# Patient Record
Sex: MALE | Race: BLACK OR AFRICAN AMERICAN | NOT HISPANIC OR LATINO | Employment: FULL TIME | URBAN - METROPOLITAN AREA
[De-identification: names, ages, dates, MRNs, and addresses within clinical notes are randomized per-mention and may not be internally consistent; named-entity substitution may affect disease eponyms.]

---

## 2023-03-08 ENCOUNTER — TELEPHONE (OUTPATIENT)
Dept: PRIMARY CARE | Facility: CLINIC | Age: 53
End: 2023-03-08
Payer: COMMERCIAL

## 2023-03-08 NOTE — TELEPHONE ENCOUNTER
Patient c/o feet swelling for 5 days. There are no appointments. Patient was offered the soonest appointment with Dr. Hernandez on 3/13/23, Monday, and advised to go to  if he needed to be seen asap. Patient declined appointment with Dr. Hernandez and said he'll figure something out. He knows you're out of the office today.

## 2023-05-19 DIAGNOSIS — N40.0 BENIGN PROSTATIC HYPERPLASIA, UNSPECIFIED WHETHER LOWER URINARY TRACT SYMPTOMS PRESENT: ICD-10-CM

## 2023-05-19 DIAGNOSIS — N40.0 BENIGN PROSTATIC HYPERPLASIA, UNSPECIFIED WHETHER LOWER URINARY TRACT SYMPTOMS PRESENT: Primary | ICD-10-CM

## 2023-05-19 PROBLEM — R07.89 TIGHTNESS IN CHEST: Status: ACTIVE | Noted: 2023-05-19

## 2023-05-19 PROBLEM — E55.9 VITAMIN D DEFICIENCY: Status: ACTIVE | Noted: 2023-05-19

## 2023-05-19 PROBLEM — I10 HYPERTENSION, ESSENTIAL, BENIGN: Status: ACTIVE | Noted: 2023-05-19

## 2023-05-19 PROBLEM — R13.10 DYSPHAGIA: Status: ACTIVE | Noted: 2023-05-19

## 2023-05-19 PROBLEM — M76.829 POSTERIOR TIBIAL TENDON DYSFUNCTION: Status: ACTIVE | Noted: 2023-05-19

## 2023-05-19 PROBLEM — R10.12 LEFT UPPER QUADRANT ABDOMINAL PAIN: Status: ACTIVE | Noted: 2023-05-19

## 2023-05-19 PROBLEM — E78.5 HYPERLIPIDEMIA: Status: ACTIVE | Noted: 2023-05-19

## 2023-05-19 PROBLEM — R06.02 SHORTNESS OF BREATH: Status: ACTIVE | Noted: 2023-05-19

## 2023-05-19 PROBLEM — M1A.0790 CHRONIC GOUT OF FOOT: Status: ACTIVE | Noted: 2023-05-19

## 2023-05-19 PROBLEM — G89.29 CHRONIC BACK PAIN: Status: ACTIVE | Noted: 2023-05-19

## 2023-05-19 PROBLEM — Z98.84 S/P GASTRIC BYPASS: Status: ACTIVE | Noted: 2023-05-19

## 2023-05-19 PROBLEM — R73.03 PREDIABETES: Status: ACTIVE | Noted: 2023-05-19

## 2023-05-19 PROBLEM — M79.673 FOOT PAIN: Status: ACTIVE | Noted: 2023-05-19

## 2023-05-19 PROBLEM — M54.9 CHRONIC BACK PAIN: Status: ACTIVE | Noted: 2023-05-19

## 2023-05-19 PROBLEM — M77.41 METATARSALGIA, RIGHT FOOT: Status: ACTIVE | Noted: 2023-05-19

## 2023-05-19 PROBLEM — N18.9 CKD (CHRONIC KIDNEY DISEASE): Status: ACTIVE | Noted: 2023-05-19

## 2023-05-19 PROBLEM — R07.89 ATYPICAL CHEST PAIN: Status: ACTIVE | Noted: 2023-05-19

## 2023-05-19 PROBLEM — E03.9 ACQUIRED HYPOTHYROIDISM: Status: ACTIVE | Noted: 2023-05-19

## 2023-05-19 LAB
ALANINE AMINOTRANSFERASE (SGPT) (U/L) IN SER/PLAS: 41 U/L (ref 10–52)
ALBUMIN (G/DL) IN SER/PLAS: 4.7 G/DL (ref 3.4–5)
ALKALINE PHOSPHATASE (U/L) IN SER/PLAS: 65 U/L (ref 33–120)
ANION GAP IN SER/PLAS: 15 MMOL/L (ref 10–20)
ASPARTATE AMINOTRANSFERASE (SGOT) (U/L) IN SER/PLAS: 40 U/L (ref 9–39)
BASOPHILS (10*3/UL) IN BLOOD BY AUTOMATED COUNT: 0.05 X10E9/L (ref 0–0.1)
BASOPHILS/100 LEUKOCYTES IN BLOOD BY AUTOMATED COUNT: 1.3 % (ref 0–2)
BILIRUBIN TOTAL (MG/DL) IN SER/PLAS: 0.5 MG/DL (ref 0–1.2)
CALCIUM (MG/DL) IN SER/PLAS: 9.7 MG/DL (ref 8.6–10.6)
CARBON DIOXIDE, TOTAL (MMOL/L) IN SER/PLAS: 30 MMOL/L (ref 21–32)
CHLORIDE (MMOL/L) IN SER/PLAS: 99 MMOL/L (ref 98–107)
CREATININE (MG/DL) IN SER/PLAS: 1.48 MG/DL (ref 0.5–1.3)
EOSINOPHILS (10*3/UL) IN BLOOD BY AUTOMATED COUNT: 0.14 X10E9/L (ref 0–0.7)
EOSINOPHILS/100 LEUKOCYTES IN BLOOD BY AUTOMATED COUNT: 3.7 % (ref 0–6)
ERYTHROCYTE DISTRIBUTION WIDTH (RATIO) BY AUTOMATED COUNT: 13.3 % (ref 11.5–14.5)
ERYTHROCYTE MEAN CORPUSCULAR HEMOGLOBIN CONCENTRATION (G/DL) BY AUTOMATED: 34.3 G/DL (ref 32–36)
ERYTHROCYTE MEAN CORPUSCULAR VOLUME (FL) BY AUTOMATED COUNT: 86 FL (ref 80–100)
ERYTHROCYTES (10*6/UL) IN BLOOD BY AUTOMATED COUNT: 4.74 X10E12/L (ref 4.5–5.9)
GFR MALE: 56 ML/MIN/1.73M2
GLUCOSE (MG/DL) IN SER/PLAS: 83 MG/DL (ref 74–99)
HEMATOCRIT (%) IN BLOOD BY AUTOMATED COUNT: 40.8 % (ref 41–52)
HEMOGLOBIN (G/DL) IN BLOOD: 14 G/DL (ref 13.5–17.5)
IMMATURE GRANULOCYTES/100 LEUKOCYTES IN BLOOD BY AUTOMATED COUNT: 0.5 % (ref 0–0.9)
LEUKOCYTES (10*3/UL) IN BLOOD BY AUTOMATED COUNT: 3.7 X10E9/L (ref 4.4–11.3)
LYMPHOCYTES (10*3/UL) IN BLOOD BY AUTOMATED COUNT: 1.34 X10E9/L (ref 1.2–4.8)
LYMPHOCYTES/100 LEUKOCYTES IN BLOOD BY AUTOMATED COUNT: 35.8 % (ref 13–44)
MONOCYTES (10*3/UL) IN BLOOD BY AUTOMATED COUNT: 0.47 X10E9/L (ref 0.1–1)
MONOCYTES/100 LEUKOCYTES IN BLOOD BY AUTOMATED COUNT: 12.6 % (ref 2–10)
NEUTROPHILS (10*3/UL) IN BLOOD BY AUTOMATED COUNT: 1.72 X10E9/L (ref 1.2–7.7)
NEUTROPHILS/100 LEUKOCYTES IN BLOOD BY AUTOMATED COUNT: 46.1 % (ref 40–80)
NRBC (PER 100 WBCS) BY AUTOMATED COUNT: 0 /100 WBC (ref 0–0)
PLATELETS (10*3/UL) IN BLOOD AUTOMATED COUNT: 215 X10E9/L (ref 150–450)
POTASSIUM (MMOL/L) IN SER/PLAS: 4.2 MMOL/L (ref 3.5–5.3)
PROTEIN TOTAL: 7.8 G/DL (ref 6.4–8.2)
SODIUM (MMOL/L) IN SER/PLAS: 140 MMOL/L (ref 136–145)
URATE (MG/DL) IN SER/PLAS: 7.3 MG/DL (ref 4–7.5)
UREA NITROGEN (MG/DL) IN SER/PLAS: 22 MG/DL (ref 6–23)

## 2023-05-19 RX ORDER — OXYBUTYNIN CHLORIDE 10 MG/1
10 TABLET, EXTENDED RELEASE ORAL DAILY
Qty: 90 TABLET | Refills: 1 | Status: SHIPPED | OUTPATIENT
Start: 2023-05-19 | End: 2023-05-19 | Stop reason: SDUPTHER

## 2023-05-19 RX ORDER — OXYBUTYNIN CHLORIDE 10 MG/1
10 TABLET, EXTENDED RELEASE ORAL DAILY
Qty: 90 TABLET | Refills: 1 | Status: SHIPPED | OUTPATIENT
Start: 2023-05-19 | End: 2023-12-29

## 2023-05-19 RX ORDER — OXYBUTYNIN CHLORIDE 10 MG/1
1 TABLET, EXTENDED RELEASE ORAL DAILY
COMMUNITY
Start: 2021-10-18 | End: 2023-05-19 | Stop reason: SDUPTHER

## 2023-08-14 DIAGNOSIS — F32.A DEPRESSION, UNSPECIFIED DEPRESSION TYPE: Primary | ICD-10-CM

## 2023-08-14 RX ORDER — VENLAFAXINE HYDROCHLORIDE 150 MG/1
150 CAPSULE, EXTENDED RELEASE ORAL
COMMUNITY
End: 2023-08-14 | Stop reason: SDUPTHER

## 2023-08-14 RX ORDER — ASPIRIN 81 MG/1
1 TABLET ORAL DAILY
COMMUNITY
Start: 2021-10-18 | End: 2024-04-22 | Stop reason: SDUPTHER

## 2023-08-14 RX ORDER — CYCLOBENZAPRINE HCL 10 MG
1 TABLET ORAL DAILY PRN
COMMUNITY
Start: 2021-10-18 | End: 2023-09-26 | Stop reason: SDUPTHER

## 2023-08-14 RX ORDER — AMITRIPTYLINE HYDROCHLORIDE 10 MG/1
TABLET, FILM COATED ORAL
COMMUNITY
Start: 2021-10-18

## 2023-08-14 RX ORDER — ALLOPURINOL 100 MG/1
TABLET ORAL
COMMUNITY
Start: 2023-03-29 | End: 2024-04-22 | Stop reason: SDUPTHER

## 2023-08-14 RX ORDER — COLCHICINE 0.6 MG/1
1 CAPSULE ORAL DAILY
COMMUNITY
Start: 2023-03-29 | End: 2023-09-26 | Stop reason: ALTCHOICE

## 2023-08-14 RX ORDER — VENLAFAXINE HYDROCHLORIDE 150 MG/1
150 CAPSULE, EXTENDED RELEASE ORAL
Qty: 30 CAPSULE | Refills: 1 | Status: SHIPPED | OUTPATIENT
Start: 2023-08-14 | End: 2023-09-29

## 2023-08-14 RX ORDER — LOSARTAN POTASSIUM 100 MG/1
100 TABLET ORAL DAILY
COMMUNITY
End: 2023-09-18 | Stop reason: SDUPTHER

## 2023-08-14 RX ORDER — ERGOCALCIFEROL 1.25 MG/1
CAPSULE ORAL
COMMUNITY
Start: 2021-10-18

## 2023-08-14 RX ORDER — ACETAMINOPHEN 500 MG
TABLET ORAL 2 TIMES DAILY
COMMUNITY
Start: 2022-12-09

## 2023-08-14 RX ORDER — ATORVASTATIN CALCIUM 40 MG/1
40 TABLET, FILM COATED ORAL DAILY
COMMUNITY
End: 2023-09-18 | Stop reason: SDUPTHER

## 2023-08-14 RX ORDER — DOCUSATE SODIUM 100 MG/1
CAPSULE, LIQUID FILLED ORAL
COMMUNITY
Start: 2022-12-21

## 2023-08-14 RX ORDER — ACARBOSE 25 MG/1
1 TABLET ORAL
COMMUNITY
Start: 2021-10-18

## 2023-08-14 RX ORDER — ESOMEPRAZOLE MAGNESIUM 40 MG/1
1 CAPSULE, DELAYED RELEASE ORAL 2 TIMES DAILY
COMMUNITY
Start: 2022-12-21 | End: 2024-04-22 | Stop reason: SDUPTHER

## 2023-08-14 RX ORDER — OMEPRAZOLE 20 MG/1
1 TABLET, DELAYED RELEASE ORAL
COMMUNITY
Start: 2022-12-21 | End: 2023-09-26 | Stop reason: ALTCHOICE

## 2023-08-14 NOTE — TELEPHONE ENCOUNTER
----- Message from Komal Philippe MA sent at 8/14/2023  9:01 AM EDT -----  Regarding: refill  Patient called today and stated there was no refill on medication angelia????? Patient could spell medication. Patient stated a fax was sent to office, please advise.

## 2023-09-13 LAB
ALANINE AMINOTRANSFERASE (SGPT) (U/L) IN SER/PLAS: 27 U/L (ref 10–52)
ALBUMIN (G/DL) IN SER/PLAS: 4.8 G/DL (ref 3.4–5)
ALKALINE PHOSPHATASE (U/L) IN SER/PLAS: 74 U/L (ref 33–120)
ANION GAP IN SER/PLAS: 14 MMOL/L (ref 10–20)
ASPARTATE AMINOTRANSFERASE (SGOT) (U/L) IN SER/PLAS: 24 U/L (ref 9–39)
BILIRUBIN TOTAL (MG/DL) IN SER/PLAS: 0.7 MG/DL (ref 0–1.2)
CALCIUM (MG/DL) IN SER/PLAS: 9.7 MG/DL (ref 8.6–10.6)
CARBON DIOXIDE, TOTAL (MMOL/L) IN SER/PLAS: 29 MMOL/L (ref 21–32)
CHLORIDE (MMOL/L) IN SER/PLAS: 99 MMOL/L (ref 98–107)
CREATININE (MG/DL) IN SER/PLAS: 1.66 MG/DL (ref 0.5–1.3)
ERYTHROCYTE DISTRIBUTION WIDTH (RATIO) BY AUTOMATED COUNT: 13.4 % (ref 11.5–14.5)
ERYTHROCYTE MEAN CORPUSCULAR HEMOGLOBIN CONCENTRATION (G/DL) BY AUTOMATED: 33.7 G/DL (ref 32–36)
ERYTHROCYTE MEAN CORPUSCULAR VOLUME (FL) BY AUTOMATED COUNT: 88 FL (ref 80–100)
ERYTHROCYTES (10*6/UL) IN BLOOD BY AUTOMATED COUNT: 5 X10E12/L (ref 4.5–5.9)
GFR MALE: 48 ML/MIN/1.73M2
GLUCOSE (MG/DL) IN SER/PLAS: 98 MG/DL (ref 74–99)
HEMATOCRIT (%) IN BLOOD BY AUTOMATED COUNT: 44.2 % (ref 41–52)
HEMOGLOBIN (G/DL) IN BLOOD: 14.9 G/DL (ref 13.5–17.5)
LEUKOCYTES (10*3/UL) IN BLOOD BY AUTOMATED COUNT: 4.3 X10E9/L (ref 4.4–11.3)
NRBC (PER 100 WBCS) BY AUTOMATED COUNT: 0 /100 WBC (ref 0–0)
PLATELETS (10*3/UL) IN BLOOD AUTOMATED COUNT: 225 X10E9/L (ref 150–450)
POTASSIUM (MMOL/L) IN SER/PLAS: 4 MMOL/L (ref 3.5–5.3)
PROTEIN TOTAL: 7.8 G/DL (ref 6.4–8.2)
SODIUM (MMOL/L) IN SER/PLAS: 138 MMOL/L (ref 136–145)
URATE (MG/DL) IN SER/PLAS: 4.6 MG/DL (ref 4–7.5)
UREA NITROGEN (MG/DL) IN SER/PLAS: 18 MG/DL (ref 6–23)

## 2023-09-18 DIAGNOSIS — E78.5 HYPERLIPIDEMIA, UNSPECIFIED HYPERLIPIDEMIA TYPE: ICD-10-CM

## 2023-09-18 DIAGNOSIS — I10 HYPERTENSION, ESSENTIAL, BENIGN: ICD-10-CM

## 2023-09-18 RX ORDER — LOSARTAN POTASSIUM 100 MG/1
100 TABLET ORAL DAILY
Qty: 90 TABLET | Refills: 0 | Status: SHIPPED | OUTPATIENT
Start: 2023-09-18 | End: 2023-09-26 | Stop reason: SDUPTHER

## 2023-09-18 RX ORDER — ATORVASTATIN CALCIUM 40 MG/1
40 TABLET, FILM COATED ORAL DAILY
Qty: 90 TABLET | Refills: 0 | Status: SHIPPED | OUTPATIENT
Start: 2023-09-18 | End: 2023-09-26 | Stop reason: SDUPTHER

## 2023-09-24 DIAGNOSIS — F32.A DEPRESSION, UNSPECIFIED DEPRESSION TYPE: ICD-10-CM

## 2023-09-26 ENCOUNTER — LAB (OUTPATIENT)
Dept: LAB | Facility: LAB | Age: 53
End: 2023-09-26
Payer: COMMERCIAL

## 2023-09-26 ENCOUNTER — OFFICE VISIT (OUTPATIENT)
Dept: PRIMARY CARE | Facility: CLINIC | Age: 53
End: 2023-09-26
Payer: COMMERCIAL

## 2023-09-26 VITALS
OXYGEN SATURATION: 96 % | BODY MASS INDEX: 36.55 KG/M2 | HEIGHT: 67 IN | TEMPERATURE: 94.6 F | DIASTOLIC BLOOD PRESSURE: 78 MMHG | SYSTOLIC BLOOD PRESSURE: 132 MMHG | HEART RATE: 101 BPM | RESPIRATION RATE: 15 BRPM | WEIGHT: 232.9 LBS

## 2023-09-26 DIAGNOSIS — E03.9 ACQUIRED HYPOTHYROIDISM: ICD-10-CM

## 2023-09-26 DIAGNOSIS — Z23 FLU VACCINE NEED: ICD-10-CM

## 2023-09-26 DIAGNOSIS — E66.01 CLASS 2 SEVERE OBESITY WITH SERIOUS COMORBIDITY AND BODY MASS INDEX (BMI) OF 36.0 TO 36.9 IN ADULT, UNSPECIFIED OBESITY TYPE (MULTI): ICD-10-CM

## 2023-09-26 DIAGNOSIS — I10 HYPERTENSION, ESSENTIAL, BENIGN: ICD-10-CM

## 2023-09-26 DIAGNOSIS — G89.29 CHRONIC MIDLINE LOW BACK PAIN WITHOUT SCIATICA: Primary | ICD-10-CM

## 2023-09-26 DIAGNOSIS — E78.5 HYPERLIPIDEMIA, UNSPECIFIED HYPERLIPIDEMIA TYPE: ICD-10-CM

## 2023-09-26 DIAGNOSIS — M54.50 CHRONIC MIDLINE LOW BACK PAIN WITHOUT SCIATICA: Primary | ICD-10-CM

## 2023-09-26 LAB — THYROTROPIN (MIU/L) IN SER/PLAS BY DETECTION LIMIT <= 0.05 MIU/L: 1.59 MIU/L (ref 0.44–3.98)

## 2023-09-26 PROCEDURE — 1036F TOBACCO NON-USER: CPT | Performed by: STUDENT IN AN ORGANIZED HEALTH CARE EDUCATION/TRAINING PROGRAM

## 2023-09-26 PROCEDURE — 99214 OFFICE O/P EST MOD 30 MIN: CPT | Performed by: STUDENT IN AN ORGANIZED HEALTH CARE EDUCATION/TRAINING PROGRAM

## 2023-09-26 PROCEDURE — 90686 IIV4 VACC NO PRSV 0.5 ML IM: CPT | Performed by: STUDENT IN AN ORGANIZED HEALTH CARE EDUCATION/TRAINING PROGRAM

## 2023-09-26 PROCEDURE — 36415 COLL VENOUS BLD VENIPUNCTURE: CPT

## 2023-09-26 PROCEDURE — 83718 ASSAY OF LIPOPROTEIN: CPT

## 2023-09-26 PROCEDURE — 3075F SYST BP GE 130 - 139MM HG: CPT | Performed by: STUDENT IN AN ORGANIZED HEALTH CARE EDUCATION/TRAINING PROGRAM

## 2023-09-26 PROCEDURE — 82465 ASSAY BLD/SERUM CHOLESTEROL: CPT

## 2023-09-26 PROCEDURE — 3078F DIAST BP <80 MM HG: CPT | Performed by: STUDENT IN AN ORGANIZED HEALTH CARE EDUCATION/TRAINING PROGRAM

## 2023-09-26 PROCEDURE — 90471 IMMUNIZATION ADMIN: CPT | Performed by: STUDENT IN AN ORGANIZED HEALTH CARE EDUCATION/TRAINING PROGRAM

## 2023-09-26 PROCEDURE — 3008F BODY MASS INDEX DOCD: CPT | Performed by: STUDENT IN AN ORGANIZED HEALTH CARE EDUCATION/TRAINING PROGRAM

## 2023-09-26 PROCEDURE — 84443 ASSAY THYROID STIM HORMONE: CPT

## 2023-09-26 RX ORDER — LEVOTHYROXINE SODIUM 150 UG/1
TABLET ORAL
Qty: 90 TABLET | Refills: 0 | Status: SHIPPED | OUTPATIENT
Start: 2023-09-26 | End: 2023-10-24

## 2023-09-26 RX ORDER — CHLORTHALIDONE 25 MG/1
25 TABLET ORAL DAILY
Qty: 90 TABLET | Refills: 0 | Status: SHIPPED | OUTPATIENT
Start: 2023-09-26 | End: 2023-10-24

## 2023-09-26 RX ORDER — CYCLOBENZAPRINE HCL 10 MG
10 TABLET ORAL 2 TIMES DAILY PRN
Qty: 30 TABLET | Refills: 1 | Status: SHIPPED | OUTPATIENT
Start: 2023-09-26

## 2023-09-26 RX ORDER — ATORVASTATIN CALCIUM 40 MG/1
40 TABLET, FILM COATED ORAL DAILY
Qty: 90 TABLET | Refills: 0 | Status: SHIPPED | OUTPATIENT
Start: 2023-09-26 | End: 2023-12-29

## 2023-09-26 RX ORDER — LOSARTAN POTASSIUM 100 MG/1
100 TABLET ORAL DAILY
Qty: 90 TABLET | Refills: 0 | Status: SHIPPED | OUTPATIENT
Start: 2023-09-26 | End: 2023-12-29

## 2023-09-26 ASSESSMENT — LIFESTYLE VARIABLES
HOW MANY STANDARD DRINKS CONTAINING ALCOHOL DO YOU HAVE ON A TYPICAL DAY: 1 OR 2
SKIP TO QUESTIONS 9-10: 1
HOW OFTEN DO YOU HAVE SIX OR MORE DRINKS ON ONE OCCASION: NEVER
AUDIT-C TOTAL SCORE: 1
HOW OFTEN DO YOU HAVE A DRINK CONTAINING ALCOHOL: MONTHLY OR LESS

## 2023-09-26 ASSESSMENT — PATIENT HEALTH QUESTIONNAIRE - PHQ9
2. FEELING DOWN, DEPRESSED OR HOPELESS: NOT AT ALL
1. LITTLE INTEREST OR PLEASURE IN DOING THINGS: NOT AT ALL
SUM OF ALL RESPONSES TO PHQ9 QUESTIONS 1 & 2: 0

## 2023-09-26 NOTE — PROGRESS NOTES
Subjective   Patient ID: Austin Andrea is a pleasant 52 y.o. male who presents for back and right hip pain (Fell 2 weeks ago).  Fall  The accident occurred More than 1 week ago (2 weeks ago at home). The fall occurred while walking. There was no blood loss. The point of impact was the right hip and buttocks. The pain is present in the right hip. The pain is at a severity of 7/10. The symptoms are aggravated by sitting. He has tried acetaminophen for the symptoms. The treatment provided mild relief.   He reports that in the past he has taken Flexeril which worked well for his chronic back pain.  We discussed about Flexeril as needed however if the pain is not improved we will obtain imaging which she is agreeable with.  I also discussed about referral to physical therapy which she would like to defer at this time.  Patient has been provided with stretching exercises that he can do at home.  He also reports that he has TENS unit at home.    Review of Systems   All other systems reviewed and are negative.      Visit Vitals  /78   Pulse 101   Temp 34.8 °C (94.6 °F)   Resp 15          Objective   Physical Exam  Constitutional:       General: He is not in acute distress.     Appearance: Normal appearance.   HENT:      Head: Normocephalic and atraumatic.   Eyes:      General: No scleral icterus.     Conjunctiva/sclera: Conjunctivae normal.   Cardiovascular:      Rate and Rhythm: Normal rate and regular rhythm.      Heart sounds: Normal heart sounds.   Pulmonary:      Effort: Pulmonary effort is normal.      Breath sounds: Normal breath sounds. No wheezing.   Abdominal:      General: Bowel sounds are normal. There is no distension.      Palpations: Abdomen is soft.      Tenderness: There is no abdominal tenderness.   Musculoskeletal:      Cervical back: Neck supple.      Right lower leg: No edema.      Left lower leg: No edema.   Lymphadenopathy:      Cervical: No cervical adenopathy.   Skin:     General: Skin is warm  and dry.   Neurological:      General: No focal deficit present.      Mental Status: He is alert and oriented to person, place, and time.   Psychiatric:         Mood and Affect: Mood normal.         Behavior: Behavior normal.         Assessment/Plan   Problem List Items Addressed This Visit       Acquired hypothyroidism    Relevant Medications    levothyroxine (Synthroid, Levoxyl) 150 mcg tablet    Other Relevant Orders    TSH with reflex to Free T4 if abnormal    Chronic back pain - Primary    Relevant Medications    cyclobenzaprine (Flexeril) 10 mg tablet    Other Relevant Orders    Disability Placard    Hyperlipidemia    Relevant Medications    atorvastatin (Lipitor) 40 mg tablet    Hypertension, essential, benign    Relevant Medications    chlorthalidone (Hygroton) 25 mg tablet    losartan (Cozaar) 100 mg tablet     Other Visit Diagnoses       Flu vaccine need        Relevant Orders    Flu vaccine (IIV4) age 6 months and greater, preservative free (Completed)    Class 2 severe obesity with serious comorbidity and body mass index (BMI) of 36.0 to 36.9 in adult, unspecified obesity type (CMS/HCC)        Relevant Orders    Lipid Panel Non-Fasting

## 2023-09-26 NOTE — PATIENT INSTRUCTIONS
Flu shot today   Continue with current medications.  Blood work before your next visit.  If blood work or imaging were ordered during your visit, all the nonurgent lab results will be discussed with you at your next office visit.  Please arrive 15 minutes before your appointment.   Return to office in 6 months or as needed

## 2023-09-26 NOTE — LETTER
September 26, 2023     Patient: Austin Andrea   YOB: 1970   Date of Visit: 9/26/2023       To Whom It May Concern:    Austin Andrea was seen in my clinic on 9/26/2023 at 1:50 pm. Please excuse Austin for his absence from the annual conference on 10/5 through 10/7 due to his current medical condition.    If you have any questions or concerns, please don't hesitate to call.         Sincerely,         Breanna Orona MD

## 2023-09-27 LAB
CHOLESTEROL (MG/DL) IN SER/PLAS: 173 MG/DL (ref 0–199)
CHOLESTEROL IN HDL (MG/DL) IN SER/PLAS: 53.7 MG/DL
CHOLESTEROL/HDL RATIO: 3.2
NON-HDL CHOLESTEROL: 119 MG/DL

## 2023-09-29 RX ORDER — VENLAFAXINE HYDROCHLORIDE 150 MG/1
CAPSULE, EXTENDED RELEASE ORAL
Qty: 30 CAPSULE | Refills: 1 | Status: SHIPPED | OUTPATIENT
Start: 2023-09-29 | End: 2023-12-12

## 2023-10-23 DIAGNOSIS — E03.9 ACQUIRED HYPOTHYROIDISM: ICD-10-CM

## 2023-10-23 DIAGNOSIS — I10 HYPERTENSION, ESSENTIAL, BENIGN: ICD-10-CM

## 2023-10-24 RX ORDER — CHLORTHALIDONE 25 MG/1
25 TABLET ORAL DAILY
Qty: 90 TABLET | Refills: 1 | Status: SHIPPED | OUTPATIENT
Start: 2023-10-24 | End: 2024-04-22 | Stop reason: SDUPTHER

## 2023-10-24 RX ORDER — LEVOTHYROXINE SODIUM 150 UG/1
TABLET ORAL
Qty: 90 TABLET | Refills: 1 | Status: SHIPPED | OUTPATIENT
Start: 2023-10-24 | End: 2024-04-22 | Stop reason: SDUPTHER

## 2023-11-11 PROBLEM — R47.01 APHASIA: Status: ACTIVE | Noted: 2018-02-10

## 2023-11-11 PROBLEM — I10 HTN (HYPERTENSION): Status: ACTIVE | Noted: 2020-01-20

## 2023-11-11 PROBLEM — E11.9 DIABETES MELLITUS (MULTI): Status: ACTIVE | Noted: 2018-02-10

## 2023-11-11 PROBLEM — F41.9 ANXIETY: Status: ACTIVE | Noted: 2020-01-20

## 2023-11-11 PROBLEM — R29.90 STROKE-LIKE SYMPTOMS: Status: ACTIVE | Noted: 2020-01-20

## 2023-11-11 RX ORDER — CYANOCOBALAMIN (VITAMIN B-12) 500 MCG
TABLET ORAL
COMMUNITY

## 2023-11-11 RX ORDER — LOSARTAN POTASSIUM AND HYDROCHLOROTHIAZIDE 12.5; 1 MG/1; MG/1
1 TABLET ORAL
COMMUNITY
End: 2024-04-22 | Stop reason: WASHOUT

## 2023-11-11 RX ORDER — MINERAL OIL
180 ENEMA (ML) RECTAL DAILY PRN
COMMUNITY
Start: 2009-12-02

## 2023-11-11 NOTE — PROGRESS NOTES
Subjective   Patient ID: Austin Andrea is a 52 y.o. male who presents for No chief complaint on file..    HPI   · Chronic gout of foot, unspecified cause, unspecified laterality (274.02) (M1A.0790)   · CKD (chronic kidney disease) (585.9) (N18.9)   · Prediabetes (790.29) (R73.03)   · Hypertension, essential, benign (401.1) (I10)   · Hyperlipidemia (272.4) (E78.5)  LAST LAB 9-2023  CREAT 1.66 GFR 48  URIC ACID 4.6  Doing well  no flares  Med ok   Allopruinol 400 mg daily     ROS      Current Outpatient Medications   Medication Sig Dispense Refill    acarbose (Precose) 25 mg tablet Take 1 tablet (25 mg) by mouth 3 times a day with meals.      acetaminophen (Tylenol Extra Strength) 500 mg tablet Take by mouth twice a day.      allopurinol (Zyloprim) 100 mg tablet Take by mouth.      amitriptyline (Elavil) 10 mg tablet Take by mouth.      aspirin 81 mg EC tablet Take 1 tablet (81 mg) by mouth once daily.      atorvastatin (Lipitor) 40 mg tablet Take 1 tablet (40 mg) by mouth once daily. 90 tablet 0    chlorthalidone (Hygroton) 25 mg tablet take 1 tablet by mouth once daily 90 tablet 1    cyclobenzaprine (Flexeril) 10 mg tablet Take 1 tablet (10 mg) by mouth 2 times a day as needed for muscle spasms. 30 tablet 1    docusate sodium (Colace) 100 mg capsule Take by mouth.      ergocalciferol (Vitamin D-2) 1.25 MG (40316 UT) capsule Take by mouth.      esomeprazole (NexIUM) 40 mg DR capsule Take 1 capsule (40 mg) by mouth 2 times a day.      levothyroxine (Synthroid, Levoxyl) 150 mcg tablet take 1 tablet by mouth every morning ON AN EMPTY STOMACH before breakfast 90 tablet 1    losartan (Cozaar) 100 mg tablet Take 1 tablet (100 mg) by mouth once daily. 90 tablet 0    NON FORMULARY 12 months      oxybutynin XL (Ditropan-XL) 10 mg 24 hr tablet Take 1 tablet (10 mg) by mouth once daily. 90 tablet 1    venlafaxine XR (Effexor-XR) 150 mg 24 hr capsule take 1 capsule by mouth twice a day after meals NEED A FOLLOW UP VISIT 30 capsule  1     No current facility-administered medications for this visit.         has a past medical history of Benign prostatic hyperplasia without lower urinary tract symptoms, Personal history of other diseases of the circulatory system, Personal history of other endocrine, nutritional and metabolic disease, Personal history of other endocrine, nutritional and metabolic disease, and Personal history of other mental and behavioral disorders.   Past Surgical History:   Procedure Laterality Date    CT GUIDED PERCUTANEOUS BIOPSY KIDNEY  8/3/2020    CT GUIDED PERCUTANEOUS BIOPSY KIDNEY 8/3/2020    OTHER SURGICAL HISTORY  10/18/2021    Lumbar vertebral fusion    OTHER SURGICAL HISTORY  10/18/2021    Spinal surgery    OTHER SURGICAL HISTORY  10/18/2021    Hip surgery      Social History     Tobacco Use    Smoking status: Never    Smokeless tobacco: Never   Substance Use Topics    Alcohol use: Not Currently    Drug use: Never      family history is not on file.  Pain Management Panel           No data to display                 There were no vitals filed for this visit.  Lab Results   Component Value Date    TSH 1.59 09/26/2023       PHYSICAL EXAM      NODES   HEART  LUNGS  ABDOMEN   VASCULAR  NEURO -  SKIN-  JOINTS -    PLAN  Diagnoses and all orders for this visit:  Chronic gout of foot, unspecified cause, unspecified laterality  Chronic kidney disease, unspecified CKD stage  Gout under excellent control  However, his renal function from May till September based on lab reveals elevated creatinine and decreased GFR  So we will need to check that today with renal panel  For now continue allopurinol 400 mg/day  See back in 6 months

## 2023-11-13 ENCOUNTER — OFFICE VISIT (OUTPATIENT)
Dept: RHEUMATOLOGY | Facility: CLINIC | Age: 53
End: 2023-11-13
Payer: COMMERCIAL

## 2023-11-13 VITALS — HEART RATE: 89 BPM | DIASTOLIC BLOOD PRESSURE: 93 MMHG | SYSTOLIC BLOOD PRESSURE: 141 MMHG | RESPIRATION RATE: 18 BRPM

## 2023-11-13 DIAGNOSIS — N18.9 CHRONIC KIDNEY DISEASE, UNSPECIFIED CKD STAGE: ICD-10-CM

## 2023-11-13 DIAGNOSIS — M1A.0790 CHRONIC GOUT OF FOOT, UNSPECIFIED CAUSE, UNSPECIFIED LATERALITY: Primary | ICD-10-CM

## 2023-11-13 PROCEDURE — 1036F TOBACCO NON-USER: CPT | Performed by: INTERNAL MEDICINE

## 2023-11-13 PROCEDURE — 3066F NEPHROPATHY DOC TX: CPT | Performed by: INTERNAL MEDICINE

## 2023-11-13 PROCEDURE — 3077F SYST BP >= 140 MM HG: CPT | Performed by: INTERNAL MEDICINE

## 2023-11-13 PROCEDURE — 4010F ACE/ARB THERAPY RXD/TAKEN: CPT | Performed by: INTERNAL MEDICINE

## 2023-11-13 PROCEDURE — 84550 ASSAY OF BLOOD/URIC ACID: CPT

## 2023-11-13 PROCEDURE — 99214 OFFICE O/P EST MOD 30 MIN: CPT | Performed by: INTERNAL MEDICINE

## 2023-11-13 PROCEDURE — 80069 RENAL FUNCTION PANEL: CPT

## 2023-11-13 PROCEDURE — 3008F BODY MASS INDEX DOCD: CPT | Performed by: INTERNAL MEDICINE

## 2023-11-13 PROCEDURE — 3080F DIAST BP >= 90 MM HG: CPT | Performed by: INTERNAL MEDICINE

## 2023-11-13 PROCEDURE — 36415 COLL VENOUS BLD VENIPUNCTURE: CPT

## 2023-11-14 LAB
ALBUMIN SERPL BCP-MCNC: 4.9 G/DL (ref 3.4–5)
ANION GAP SERPL CALC-SCNC: 14 MMOL/L (ref 10–20)
BUN SERPL-MCNC: 21 MG/DL (ref 6–23)
CALCIUM SERPL-MCNC: 9.9 MG/DL (ref 8.6–10.6)
CHLORIDE SERPL-SCNC: 98 MMOL/L (ref 98–107)
CO2 SERPL-SCNC: 30 MMOL/L (ref 21–32)
CREAT SERPL-MCNC: 1.41 MG/DL (ref 0.5–1.3)
GFR SERPL CREATININE-BSD FRML MDRD: 59 ML/MIN/1.73M*2
GLUCOSE SERPL-MCNC: 89 MG/DL (ref 74–99)
PHOSPHATE SERPL-MCNC: 3.1 MG/DL (ref 2.5–4.9)
POTASSIUM SERPL-SCNC: 4.4 MMOL/L (ref 3.5–5.3)
SODIUM SERPL-SCNC: 138 MMOL/L (ref 136–145)
URATE SERPL-MCNC: 5.1 MG/DL (ref 4–7.5)

## 2023-12-12 DIAGNOSIS — F32.A DEPRESSION, UNSPECIFIED DEPRESSION TYPE: ICD-10-CM

## 2023-12-12 RX ORDER — VENLAFAXINE HYDROCHLORIDE 150 MG/1
CAPSULE, EXTENDED RELEASE ORAL
Qty: 30 CAPSULE | Refills: 1 | Status: SHIPPED | OUTPATIENT
Start: 2023-12-12 | End: 2024-04-22 | Stop reason: SDUPTHER

## 2023-12-15 DIAGNOSIS — N40.0 BENIGN PROSTATIC HYPERPLASIA, UNSPECIFIED WHETHER LOWER URINARY TRACT SYMPTOMS PRESENT: ICD-10-CM

## 2023-12-18 DIAGNOSIS — I10 HYPERTENSION, ESSENTIAL, BENIGN: ICD-10-CM

## 2023-12-18 DIAGNOSIS — E78.5 HYPERLIPIDEMIA, UNSPECIFIED HYPERLIPIDEMIA TYPE: ICD-10-CM

## 2023-12-29 DIAGNOSIS — E78.5 HYPERLIPIDEMIA, UNSPECIFIED HYPERLIPIDEMIA TYPE: ICD-10-CM

## 2023-12-29 DIAGNOSIS — I10 HYPERTENSION, ESSENTIAL, BENIGN: ICD-10-CM

## 2023-12-29 RX ORDER — OXYBUTYNIN CHLORIDE 10 MG/1
10 TABLET, EXTENDED RELEASE ORAL DAILY
Qty: 90 TABLET | Refills: 1 | Status: SHIPPED | OUTPATIENT
Start: 2023-12-29 | End: 2024-04-22 | Stop reason: SDUPTHER

## 2023-12-29 RX ORDER — LOSARTAN POTASSIUM 100 MG/1
100 TABLET ORAL DAILY
Qty: 90 TABLET | Refills: 0 | Status: SHIPPED | OUTPATIENT
Start: 2023-12-29 | End: 2024-03-25

## 2023-12-29 RX ORDER — ATORVASTATIN CALCIUM 40 MG/1
40 TABLET, FILM COATED ORAL DAILY
Qty: 90 TABLET | Refills: 0 | Status: SHIPPED | OUTPATIENT
Start: 2023-12-29 | End: 2024-03-25

## 2024-01-02 RX ORDER — LOSARTAN POTASSIUM 100 MG/1
100 TABLET ORAL DAILY
Qty: 90 TABLET | Refills: 0 | OUTPATIENT
Start: 2024-01-02

## 2024-01-02 RX ORDER — ATORVASTATIN CALCIUM 40 MG/1
40 TABLET, FILM COATED ORAL DAILY
Qty: 90 TABLET | Refills: 0 | OUTPATIENT
Start: 2024-01-02

## 2024-03-25 DIAGNOSIS — E78.5 HYPERLIPIDEMIA, UNSPECIFIED HYPERLIPIDEMIA TYPE: ICD-10-CM

## 2024-03-25 DIAGNOSIS — I10 HYPERTENSION, ESSENTIAL, BENIGN: ICD-10-CM

## 2024-03-25 RX ORDER — ATORVASTATIN CALCIUM 40 MG/1
40 TABLET, FILM COATED ORAL DAILY
Qty: 90 TABLET | Refills: 1 | Status: SHIPPED | OUTPATIENT
Start: 2024-03-25 | End: 2024-04-22 | Stop reason: SDUPTHER

## 2024-03-25 RX ORDER — LOSARTAN POTASSIUM 100 MG/1
100 TABLET ORAL DAILY
Qty: 90 TABLET | Refills: 1 | Status: SHIPPED | OUTPATIENT
Start: 2024-03-25 | End: 2024-04-22 | Stop reason: SDUPTHER

## 2024-04-09 ENCOUNTER — TELEPHONE (OUTPATIENT)
Dept: PRIMARY CARE | Facility: CLINIC | Age: 54
End: 2024-04-09
Payer: COMMERCIAL

## 2024-04-09 DIAGNOSIS — Z98.84 S/P GASTRIC BYPASS: ICD-10-CM

## 2024-04-09 DIAGNOSIS — R29.90 STROKE-LIKE SYMPTOMS: ICD-10-CM

## 2024-04-09 DIAGNOSIS — M1A.0790 CHRONIC GOUT OF FOOT, UNSPECIFIED CAUSE, UNSPECIFIED LATERALITY: Primary | ICD-10-CM

## 2024-04-09 DIAGNOSIS — E78.5 HYPERLIPIDEMIA, UNSPECIFIED HYPERLIPIDEMIA TYPE: ICD-10-CM

## 2024-04-09 DIAGNOSIS — I10 HYPERTENSION, ESSENTIAL, BENIGN: ICD-10-CM

## 2024-04-09 DIAGNOSIS — N40.0 BENIGN PROSTATIC HYPERPLASIA, UNSPECIFIED WHETHER LOWER URINARY TRACT SYMPTOMS PRESENT: ICD-10-CM

## 2024-04-09 DIAGNOSIS — F32.A DEPRESSION, UNSPECIFIED DEPRESSION TYPE: ICD-10-CM

## 2024-04-09 DIAGNOSIS — E03.9 ACQUIRED HYPOTHYROIDISM: ICD-10-CM

## 2024-04-09 NOTE — TELEPHONE ENCOUNTER
Patient is requesting for all of his medications to be refilled. He states he has recently located and would like to know if Dr. Orona is willing to fill them while he looks for a new provider.

## 2024-04-22 RX ORDER — LOSARTAN POTASSIUM 100 MG/1
100 TABLET ORAL DAILY
Qty: 90 TABLET | Refills: 0 | Status: SHIPPED | OUTPATIENT
Start: 2024-04-22

## 2024-04-22 RX ORDER — ASPIRIN 81 MG/1
81 TABLET ORAL DAILY
Qty: 90 TABLET | Refills: 0 | Status: SHIPPED | OUTPATIENT
Start: 2024-04-22

## 2024-04-22 RX ORDER — LEVOTHYROXINE SODIUM 150 UG/1
TABLET ORAL
Qty: 90 TABLET | Refills: 0 | Status: SHIPPED | OUTPATIENT
Start: 2024-04-22

## 2024-04-22 RX ORDER — VENLAFAXINE HYDROCHLORIDE 150 MG/1
CAPSULE, EXTENDED RELEASE ORAL
Qty: 90 CAPSULE | Refills: 0 | Status: SHIPPED | OUTPATIENT
Start: 2024-04-22

## 2024-04-22 RX ORDER — ATORVASTATIN CALCIUM 40 MG/1
40 TABLET, FILM COATED ORAL DAILY
Qty: 90 TABLET | Refills: 0 | Status: SHIPPED | OUTPATIENT
Start: 2024-04-22

## 2024-04-22 RX ORDER — ALLOPURINOL 100 MG/1
50 TABLET ORAL DAILY
Qty: 45 TABLET | Refills: 0 | Status: SHIPPED | OUTPATIENT
Start: 2024-04-22

## 2024-04-22 RX ORDER — ESOMEPRAZOLE MAGNESIUM 40 MG/1
40 CAPSULE, DELAYED RELEASE ORAL
Qty: 90 CAPSULE | Refills: 0 | Status: SHIPPED | OUTPATIENT
Start: 2024-04-22

## 2024-04-22 RX ORDER — OXYBUTYNIN CHLORIDE 10 MG/1
10 TABLET, EXTENDED RELEASE ORAL DAILY
Qty: 90 TABLET | Refills: 0 | Status: SHIPPED | OUTPATIENT
Start: 2024-04-22

## 2024-04-22 RX ORDER — CHLORTHALIDONE 25 MG/1
25 TABLET ORAL DAILY
Qty: 90 TABLET | Refills: 0 | Status: SHIPPED | OUTPATIENT
Start: 2024-04-22

## 2024-04-22 NOTE — TELEPHONE ENCOUNTER
Patient called again regarding the following message. He has 1 pill left. venlafaxine XR   Patient is requesting for all of his medications to be refilled. He states he has recently located and would like to know if Dr. Orona is willing to fill them while he looks for a new provider.    [id number] - [place], [place] -  Phone: [phone]   Fax: [phone]

## 2024-05-23 NOTE — PROGRESS NOTES
Subjective   Patient ID: Austin Andrea is a 53 y.o. male who presents for No chief complaint on file..    HPI    Last visit 11-     · Chronic gout of foot, unspecified cause, unspecified laterality (274.02) (M1A.0790)   · CKD (chronic kidney disease) (585.9) (N18.9)   · Prediabetes (790.29) (R73.03)   · Hypertension, essential, benign (401.1) (I10)   · Hyperlipidemia (272.4) (E78.5)  LAST LAB 9-2023  CREAT 1.66 GFR 48  URIC ACID 4.6  Doing well  no flares  Med ok   Allopruinol 400 mg daily     ROS      Current Outpatient Medications   Medication Sig Dispense Refill    acarbose (Precose) 25 mg tablet Take 1 tablet (25 mg) by mouth 3 times a day with meals.      acetaminophen (Tylenol Extra Strength) 500 mg tablet Take by mouth twice a day.      allopurinol (Zyloprim) 100 mg tablet Take 0.5 tablets (50 mg) by mouth once daily. 45 tablet 0    amitriptyline (Elavil) 10 mg tablet Take by mouth.      aspirin 81 mg EC tablet Take 1 tablet (81 mg) by mouth once daily. 90 tablet 0    atorvastatin (Lipitor) 40 mg tablet Take 1 tablet (40 mg) by mouth once daily. 90 tablet 0    chlorthalidone (Hygroton) 25 mg tablet Take 1 tablet (25 mg) by mouth once daily. 90 tablet 0    cholecalciferol (Vitamin D-3) 10 MCG (400 UNIT) tablet Take by mouth.      cyanocobalamin (Vitamin B-12) 100 mcg tablet Take 1 tablet (100 mcg) by mouth once daily.      cyclobenzaprine (Flexeril) 10 mg tablet Take 1 tablet (10 mg) by mouth 2 times a day as needed for muscle spasms. 30 tablet 1    docusate sodium (Colace) 100 mg capsule Take by mouth.      ergocalciferol (Vitamin D-2) 1.25 MG (75810 UT) capsule Take by mouth.      esomeprazole (NexIUM) 40 mg DR capsule Take 1 capsule (40 mg) by mouth once daily in the morning. Take before meals. 90 capsule 0    fexofenadine (Allegra) 180 mg tablet Take 1 tablet (180 mg) by mouth once daily as needed.      levothyroxine (Synthroid, Levoxyl) 150 mcg tablet take 1 tablet by mouth every morning ON AN EMPTY  STOMACH before breakfast 90 tablet 0    losartan (Cozaar) 100 mg tablet Take 1 tablet (100 mg) by mouth once daily. 90 tablet 0    NON FORMULARY 12 months      oxybutynin XL (Ditropan-XL) 10 mg 24 hr tablet Take 1 tablet (10 mg) by mouth once daily. 90 tablet 0    venlafaxine XR (Effexor-XR) 150 mg 24 hr capsule take 1 capsule by mouth twice a day after meals NEED A FOLLOW UP VISIT 90 capsule 0     No current facility-administered medications for this visit.         has a past medical history of Benign prostatic hyperplasia without lower urinary tract symptoms, Personal history of other diseases of the circulatory system, Personal history of other endocrine, nutritional and metabolic disease, Personal history of other endocrine, nutritional and metabolic disease, and Personal history of other mental and behavioral disorders.   Past Surgical History:   Procedure Laterality Date    CT GUIDED PERCUTANEOUS BIOPSY KIDNEY  8/3/2020    CT GUIDED PERCUTANEOUS BIOPSY KIDNEY 8/3/2020    OTHER SURGICAL HISTORY  10/18/2021    Lumbar vertebral fusion    OTHER SURGICAL HISTORY  10/18/2021    Spinal surgery    OTHER SURGICAL HISTORY  10/18/2021    Hip surgery      Social History     Tobacco Use    Smoking status: Never    Smokeless tobacco: Never   Substance Use Topics    Alcohol use: Not Currently    Drug use: Never      family history includes COPD in his father; Diabetes in his mother.  Pain Management Panel           No data to display                 There were no vitals filed for this visit.  Lab Results   Component Value Date    TSH 1.59 09/26/2023       PHYSICAL EXAM      NODES   HEART  LUNGS  ABDOMEN   VASCULAR  NEURO -  SKIN-  JOINTS -    PLAN  Diagnoses and all orders for this visit:  Chronic gout of foot, unspecified cause, unspecified laterality    Gout under excellent control  However, his renal function from May till September based on lab reveals elevated creatinine and decreased GFR  So we will need to check that  today with renal panel  For now continue allopurinol 400 mg/day  See back in 6 months

## 2024-05-24 ENCOUNTER — APPOINTMENT (OUTPATIENT)
Dept: RHEUMATOLOGY | Facility: CLINIC | Age: 54
End: 2024-05-24
Payer: COMMERCIAL

## 2024-05-24 DIAGNOSIS — M1A.0790 CHRONIC GOUT OF FOOT, UNSPECIFIED CAUSE, UNSPECIFIED LATERALITY: Primary | ICD-10-CM
